# Patient Record
Sex: FEMALE | Race: WHITE
[De-identification: names, ages, dates, MRNs, and addresses within clinical notes are randomized per-mention and may not be internally consistent; named-entity substitution may affect disease eponyms.]

---

## 2017-05-04 ENCOUNTER — HOSPITAL ENCOUNTER (EMERGENCY)
Dept: HOSPITAL 74 - JER | Age: 57
Discharge: HOME | End: 2017-05-04
Payer: COMMERCIAL

## 2017-05-04 VITALS — HEART RATE: 77 BPM | DIASTOLIC BLOOD PRESSURE: 82 MMHG | SYSTOLIC BLOOD PRESSURE: 148 MMHG

## 2017-05-04 VITALS — BODY MASS INDEX: 40.9 KG/M2

## 2017-05-04 VITALS — TEMPERATURE: 98.5 F

## 2017-05-04 DIAGNOSIS — R00.2: ICD-10-CM

## 2017-05-04 DIAGNOSIS — K21.9: ICD-10-CM

## 2017-05-04 DIAGNOSIS — N39.0: Primary | ICD-10-CM

## 2017-05-04 DIAGNOSIS — I10: ICD-10-CM

## 2017-05-04 LAB
ALBUMIN SERPL-MCNC: 3.6 G/DL (ref 3.4–5)
ALP SERPL-CCNC: 105 U/L (ref 45–117)
ALT SERPL-CCNC: 28 U/L (ref 12–78)
ANION GAP SERPL CALC-SCNC: 9 MMOL/L (ref 8–16)
APPEARANCE UR: CLEAR
AST SERPL-CCNC: 17 U/L (ref 15–37)
BASOPHILS # BLD: 0.7 % (ref 0–2)
BILIRUB SERPL-MCNC: 0.2 MG/DL (ref 0.2–1)
BILIRUB UR STRIP.AUTO-MCNC: NEGATIVE MG/DL
CALCIUM SERPL-MCNC: 9.3 MG/DL (ref 8.5–10.1)
CO2 SERPL-SCNC: 28 MMOL/L (ref 21–32)
COCKROFT - GAULT: 111.27
COLOR UR: (no result)
CREAT SERPL-MCNC: 0.9 MG/DL (ref 0.55–1.02)
DEPRECATED RDW RBC AUTO: 14.3 % (ref 11.6–15.6)
EOSINOPHIL # BLD: 1.4 % (ref 0–4.5)
GLUCOSE SERPL-MCNC: 112 MG/DL (ref 74–106)
KETONES UR QL STRIP: NEGATIVE
LEUKOCYTE ESTERASE UR QL STRIP.AUTO: (no result)
MCH RBC QN AUTO: 28.2 PG (ref 25.7–33.7)
MCHC RBC AUTO-ENTMCNC: 32.7 G/DL (ref 32–36)
MCV RBC: 86.3 FL (ref 80–96)
MUCOUS THREADS URNS QL MICRO: (no result)
NEUTROPHILS # BLD: 61.3 % (ref 42.8–82.8)
NITRITE UR QL STRIP: NEGATIVE
PH UR: 6 [PH] (ref 5–8)
PLATELET # BLD AUTO: 219 K/MM3 (ref 134–434)
PMV BLD: 10.4 FL (ref 7.5–11.1)
PROT SERPL-MCNC: 7.2 G/DL (ref 6.4–8.2)
PROT UR QL STRIP: NEGATIVE
PROT UR QL STRIP: NEGATIVE
RBC # BLD AUTO: 3 /HPF (ref 0–3)
RBC # UR STRIP: (no result) /UL
UROBILINOGEN UR STRIP-MCNC: NEGATIVE E.U./DL (ref 0.2–1)
WBC # BLD AUTO: 7 K/MM3 (ref 4–10)
WBC # UR AUTO: 6 /HPF (ref 3–5)

## 2017-05-04 NOTE — PDOC
*Physical Exam





- Vital Signs


 Last Vital Signs











Temp Pulse Resp BP Pulse Ox


 


 98.5 F   79   18   155/83   98 


 


 05/04/17 16:22  05/04/17 16:22  05/04/17 16:22  05/04/17 16:22  05/04/17 16:22














ED Treatment Course





- LABORATORY


CBC & Chemistry Diagram: 


 05/04/17 18:07





 05/04/17 18:07





- ADDITIONAL ORDERS


Additional order review: 


 Laboratory  Results











  05/04/17 05/04/17 05/04/17





  18:07 18:07 16:45


 


Sodium   141 


 


Potassium   3.9 


 


Chloride   104 


 


Carbon Dioxide   28 


 


Anion Gap   9 


 


BUN   16 


 


Creatinine   0.9 


 


Creat Clearance w eGFR   > 60 


 


Random Glucose   112 H 


 


Calcium   9.3 


 


Total Bilirubin   0.2 


 


AST   17 


 


ALT   28 


 


Alkaline Phosphatase   105 


 


Total Protein   7.2 


 


Albumin   3.6 


 


Lipase  110  


 


Urine Color    Ltyellow


 


Urine Appearance    Clear


 


Urine pH    6.0


 


Urine Protein    Negative


 


Urine Glucose (UA)    Negative


 


Urine Ketones    Negative


 


Urine Blood    2+ H


 


Urine Nitrite    Negative


 


Urine Bilirubin    Negative


 


Urine Urobilinogen    Negative


 


Ur Leukocyte Esterase    Trace H


 


Urine RBC    3


 


Urine WBC    6


 


Ur Epithelial Cells    Rare


 


Urine Mucus    Rare








 











  05/04/17





  18:07


 


RBC  4.94


 


MCV  86.3


 


MCHC  32.7


 


RDW  14.3


 


MPV  10.4  D


 


Neutrophils %  61.3  D


 


Lymphocytes %  27.5  D


 


Monocytes %  9.1


 


Eosinophils %  1.4  D


 


Basophils %  0.7














*DC/Admit/Observation/Transfer


Diagnosis at time of Disposition: 


Abdominal pain


Qualifiers:


 Abdominal location: generalized Qualified Code(s): R10.84 - Generalized 

abdominal pain





Urinary tract infection


Qualifiers:


 Urinary tract infection type: site unspecified Hematuria presence: without 

hematuria Qualified Code(s): N39.0 - Urinary tract infection, site not specified





- Discharge Dispostion


Disposition: HOME


Condition at time of disposition: Stable


Admit: No





- Referrals


Referrals: 


Ti Freed MD [Primary Care Provider] - 





- Patient Instructions


Printed Discharge Instructions:  DI for Urinary Tract Infection (UTI), DI for 

Abdominal Pain-Adult


Additional Instructions: 


Please follow u with your primary care doctor if sympotms don't improve





- Post Discharge Activity

## 2017-05-04 NOTE — PDOC
[id number] 57 year old female with significant medical hx of GERD, HTN, and 

intermittent heart palpitations who is presenting to the ED with abdominal pain 

and nausea. Patient reports two days ago having lower abdominal pain with nausea

, vomiting, and diarrhea. Patient endorses four episodes of bloody loose stool 

at that time. Today the patient denies any vomiting but complains of crampy 

lower abdominal pain, that she describes as sharp, with chills and nausea. 

The patient also reports having one BM today. The patient endorses a history of 

episodic colitis.


Denies fever, dysuria, chest pain, recent abx or recent travel.





PMD: Ti Abreu MD








<Era Henning - Last Filed: 05/04/17 18:18>





- General


History Source: Patient, Care Provider





<Nanda Luna - Last Filed: 05/12/17 13:31>





- General


Chief Complaint: Pain


Stated Complaint: STOMACH PAIN





Past History





<Era Henning - Last Filed: 05/04/17 18:18>





- Past Medical History


Cardiac Disorders: Yes (? SVT)


GI Disorders: Yes (GASTRITIS)


HTN: Yes





- Surgical History


Cholecystectomy: Yes





- Psycho/Social/Smoking Cessation Hx


Anxiety: No


Suicidal Ideation: No


Smoking Status: No


Smoking History: Never smoked


Number of Cigarettes Smoked Daily: 0


Information on smoking cessation initiated: No


Hx Alcohol Use: No


Drug/Substance Use Hx: No


Substance Use Type: None


Hx Substance Use Treatment: No





<Nanda Luna - Last Filed: 05/12/17 13:31>





- Past Medical History


Allergies/Adverse Reactions: 


 Allergies











Allergy/AdvReac Type Severity Reaction Status Date / Time


 


No Known Allergies Allergy   Verified 05/04/17 16:24











Home Medications: 


Ambulatory Orders





Amlodipine Besylate [Norvasc] 25 mg PO DAILY 05/29/12 


Pantoprazole Sodium [Protonix] 40 mg PO DAILY 05/04/15 


Sulfamethoxazole/Trimethoprim [Bactrim *Ds*] 1 tab PO BID #14 tablet 05/04/17 











**Review of Systems





- Review of Systems


Comments:: 


05/04/17 18:19


CONSTITUTIONAL:


Present: chills


Absent: fever, diaphoresis, generalized weakness, malaise, loss of appetite


HEENT:


Absent: rhinorrhea, nasal congestion, throat pain, throat swelling, difficulty 

swallowing, mouth swelling, ear pain, eye pain, visual changes


CARDIOVASCULAR: 


Absent: chest pain, syncope, palpitations, irregular heart rate, lightheadedness

, peripheral edema


RESPIRATORY: 


Absent: cough, shortness of breath, dyspnea with exertion, orthopnea, wheezing, 

stridor, hemoptysis


GASTROINTESTINAL:


Present: abdominal pain, nausea, vomiting diarrhea, blood in stool


Absent: abdominal distension, constipation, melena, hematochezia


GENITOURINARY: 


Absent: dysuria, frequency, urgency, hesitancy, hematuria, flank pain, genital 

pain


MUSCULOSKELETAL: 


Absent: myalgia, arthralgia, joint swelling


SKIN: 


Absent: rash, itching, pallor


HEMATOLOGIC/IMMUNOLOGIC: 


Absent: easy bleeding, easy bruising, lymphadenopathy, frequent infections


ENDOCRINE:


Absent: unexplained weight gain, unexplained weight loss, heat intolerance, 

cold intolerance


NEUROLOGIC: 


Absent: headache, focal weakness or paresthesia, dizziness, unsteady gait, 

seizure, mental status changes, bladder or bowel incontinence.


PSYCHIATRIC: 


Absent: anxiety, depression, suicidal or homicidal ideation, hallucinations








<Era Henning - Last Filed: 05/04/17 18:18>





- Review of Systems


Constitutional: Yes: Chills.  No: Diaphoresis, Fever


Respiratory: No: Cough, Orthopnea


Cardiac (ROS): No: Chest Pain, Edema


ABD/GI: Yes: Abd. Pain w/ defecation


: No: Dysuria


All Other Systems: Reviewed and Negative





<Nanda Luna - Last Filed: 05/12/17 13:31>





*Physical Exam





- Vital Signs


 Last Vital Signs











Temp Pulse Resp BP Pulse Ox


 


 98.5 F   79   18   155/83   98 


 


 05/04/17 16:22  05/04/17 16:22  05/04/17 16:22  05/04/17 16:22  05/04/17 16:22














- Physical Exam


Comments: 


05/04/17 18:21


GENERAL:


Well developed, well nourished. Awake and alert. No acute distress.


HEENT:


Normocephalic, atraumatic. PERRLA, EOMI. No conjunctival pallor. Sclera are non-

icteric. Moist mucous membranes. Oropharynx is clear.


NECK: 


Supple. Full ROM. No JVD. Carotid pulses 2+ and symmetric, without bruits. No 

thyromegaly. No 


lymphadenopathy.


CARDIOVASCULAR:


Regular rate and rhythm. No murmurs, rubs, or gallops. Distal pulses are 2+ and 

symmetric. 


PULMONARY: 


No evidence of respiratory distress. Lungs clear to auscultation bilaterally. 

No wheezing, rales or rhonchi.


ABDOMINAL:


Obese, mild suprapubic tenderness to palpation. Non-distended. No rebound or 

guarding. No organomegaly. Normoactive bowel sounds. 


MUSCULOSKELETAL: 


Normal range of motion at all joints. No bony deformities or tenderness. No CVA 

tenderness.


EXTREMITIES: 


No cyanosis. No clubbing. No edema. No calf tenderness.


SKIN: 


Warm and dry. Normal capillary refill. No rashes. No jaundice. 


NEUROLOGICAL: 


Alert, awake, appropriate. Cranial nerves 2-12 intact. Normal speech. Gait is 

normal without ataxia.


PSYCHIATRIC: 


Cooperative. Good eye contact. Appropriate mood and affect.








<Era Henning - Last Filed: 05/04/17 18:18>





- Vital Signs


 Last Vital Signs











Temp Pulse Resp BP Pulse Ox


 


 98.5 F   79   18   155/83   98 


 


 05/04/17 16:22  05/04/17 16:22  05/04/17 16:22  05/04/17 16:22  05/04/17 16:22














<Nanda Luna - Last Filed: 05/12/17 13:31>





ED Treatment Course





- LABORATORY


CBC & Chemistry Diagram: 


 05/04/17 18:07





 05/04/17 18:07





<Era Henning - Last Filed: 05/04/17 18:18>





- LABORATORY


CBC & Chemistry Diagram: 


 05/04/17 18:07





 05/04/17 18:07





- RADIOLOGY


Radiology Studies Ordered: 














 Category Date Time Status


 


 ABDOMEN & PELVIS CT WITH CONTR [CT] Stat CT Scan  05/04/17 18:08 Ordered














<Nanda Luna - Last Filed: 05/12/17 13:31>





Medical Decision Making





- Medical Decision Making





05/04/17 18:14


58 yo  with h/o HTN here with episode of lower abd pain, n/v /d. pt states sxs 

started 2 days ago, with 3 - 4 times nonbloody emesis, loose stool with blood x 

3. sharp lower abd pain. no h/o renal colic. does have h/o episodic colitis. no 

f/c no travel. no abx. pcp Abreu. 


on exam, abd with mild lower abd ttp, no reboun or guarding.


ddgx: diverticulitis, uti pyelo colitis, renal colic. plan labs lipase ua ct a/

p . pt declined pain medication at this time. iv hydration. denies persistant 

nausea.


d/w dr. abreu.





<Nanda Luna - Last Filed: 05/12/17 13:31>





*DC/Admit/Observation/Transfer





- Attestations


Scribe Attestion: 


05/04/17 18:22


Documentation prepared by Era Henning, acting as medical scribe for Nanda Luna MD.





<Era Henning - Last Filed: 05/04/17 18:18>





<Nanda Luna - Last Filed: 05/12/17 13:31>


Diagnosis at time of Disposition: 


Abdominal pain


Qualifiers:


 Abdominal location: generalized Qualified Code(s): R10.84 - Generalized 

abdominal pain





UTI (urinary tract infection)


Qualifiers:


 Urinary tract infection type: site unspecified Hematuria presence: without 

hematuria Qualified Code(s): N39.0 - Urinary tract infection, site not specified





- Discharge Dispostion


Disposition: HOME





- Prescriptions


Prescriptions: 


Sulfamethoxazole/Trimethoprim [Bactrim *Ds*] 1 tab PO BID #14 tablet





- Referrals


Referrals: 


Ti Abreu MD [Primary Care Provider] - 





- Patient Instructions


Printed Discharge Instructions:  DI for Urinary Tract Infection (UTI), DI for 

Abdominal Pain-Adult


Additional Instructions: 


Please follow u with your primary care doctor if sympotms don't improve

## 2017-05-05 LAB — SP GR UR: 1.02 (ref 1–1.02)

## 2019-08-19 ENCOUNTER — HOSPITAL ENCOUNTER (EMERGENCY)
Dept: HOSPITAL 74 - JER | Age: 59
Discharge: HOME | End: 2019-08-19
Payer: SELF-PAY

## 2019-08-19 VITALS — BODY MASS INDEX: 42 KG/M2

## 2019-08-19 VITALS — SYSTOLIC BLOOD PRESSURE: 124 MMHG | DIASTOLIC BLOOD PRESSURE: 68 MMHG | HEART RATE: 68 BPM | TEMPERATURE: 98.5 F

## 2019-08-19 DIAGNOSIS — R10.13: Primary | ICD-10-CM

## 2019-08-19 DIAGNOSIS — K80.80: ICD-10-CM

## 2019-08-19 LAB
ALBUMIN SERPL-MCNC: 3.4 G/DL (ref 3.4–5)
ALP SERPL-CCNC: 95 U/L (ref 45–117)
ALT SERPL-CCNC: 54 U/L (ref 13–61)
ANION GAP SERPL CALC-SCNC: 5 MMOL/L (ref 8–16)
APPEARANCE UR: (no result)
AST SERPL-CCNC: 54 U/L (ref 15–37)
BACTERIA # UR AUTO: 145.8 /HPF
BASOPHILS # BLD: 1 % (ref 0–2)
BILIRUB SERPL-MCNC: 0.6 MG/DL (ref 0.2–1)
BILIRUB UR STRIP.AUTO-MCNC: NEGATIVE MG/DL
BUN SERPL-MCNC: 12.8 MG/DL (ref 7–18)
CALCIUM SERPL-MCNC: 9 MG/DL (ref 8.5–10.1)
CASTS URNS QL MICRO: 14 /LPF (ref 0–8)
CHLORIDE SERPL-SCNC: 108 MMOL/L (ref 98–107)
CO2 SERPL-SCNC: 29 MMOL/L (ref 21–32)
COLOR UR: YELLOW
CREAT SERPL-MCNC: 0.8 MG/DL (ref 0.55–1.3)
DEPRECATED RDW RBC AUTO: 13.8 % (ref 11.6–15.6)
EOSINOPHIL # BLD: 2.2 % (ref 0–4.5)
EPITH CASTS URNS QL MICRO: 11.8 /HPF
GLUCOSE SERPL-MCNC: 98 MG/DL (ref 74–106)
HCT VFR BLD CALC: 42.9 % (ref 32.4–45.2)
HGB BLD-MCNC: 14.4 GM/DL (ref 10.7–15.3)
KETONES UR QL STRIP: NEGATIVE
LEUKOCYTE ESTERASE UR QL STRIP.AUTO: (no result)
LIPASE SERPL-CCNC: 61 U/L (ref 73–393)
LYMPHOCYTES # BLD: 21.7 % (ref 8–40)
MCH RBC QN AUTO: 29.2 PG (ref 25.7–33.7)
MCHC RBC AUTO-ENTMCNC: 33.5 G/DL (ref 32–36)
MCV RBC: 87 FL (ref 80–96)
MONOCYTES # BLD AUTO: 9.4 % (ref 3.8–10.2)
NEUTROPHILS # BLD: 65.7 % (ref 42.8–82.8)
NITRITE UR QL STRIP: NEGATIVE
PH UR: 6.5 [PH] (ref 5–8)
PLATELET # BLD AUTO: 215 K/MM3 (ref 134–434)
PMV BLD: 9 FL (ref 7.5–11.1)
POTASSIUM SERPLBLD-SCNC: 4.1 MMOL/L (ref 3.5–5.1)
PROT SERPL-MCNC: 7.3 G/DL (ref 6.4–8.2)
PROT UR QL STRIP: (no result)
PROT UR QL STRIP: NEGATIVE
RBC # BLD AUTO: 4.93 M/MM3 (ref 3.6–5.2)
RBC # BLD AUTO: 6.6 /HPF (ref 0–4)
SODIUM SERPL-SCNC: 142 MMOL/L (ref 136–145)
SP GR UR: 1.02 (ref 1.01–1.03)
UROBILINOGEN UR STRIP-MCNC: 1 MG/DL (ref 0.2–1)
WBC # BLD AUTO: 5.6 K/MM3 (ref 4–10)
WBC # UR AUTO: 8 /HPF (ref 0–5)

## 2019-08-19 PROCEDURE — 3E033GC INTRODUCTION OF OTHER THERAPEUTIC SUBSTANCE INTO PERIPHERAL VEIN, PERCUTANEOUS APPROACH: ICD-10-PCS

## 2019-08-19 PROCEDURE — 3E0333Z INTRODUCTION OF ANTI-INFLAMMATORY INTO PERIPHERAL VEIN, PERCUTANEOUS APPROACH: ICD-10-PCS

## 2019-08-19 NOTE — PDOC
Rapid Medical Evaluation


Chief Complaint: Nausea/Vomiting


Time Seen by Provider: 08/19/19 13:15


Medical Evaluation: 


 Allergies











Allergy/AdvReac Type Severity Reaction Status Date / Time


 


No Known Allergies Allergy   Verified 07/03/18 17:36











08/19/19 13:15





CC: epigastric pain with vomiting





PE: epigastric and RUQ tenderness- no guarding





Orders: abdominal w/u





Patient will proceed to ED for continued evaluation.





**Discharge Disposition





- Diagnosis


 Abdominal pain








- Referrals


Referrals: 


Ti Freed MD [Primary Care Provider] - 





- Patient Instructions





- Post Discharge Activity

## 2019-08-19 NOTE — PDOC
*Physical Exam





- Vital Signs


 Last Vital Signs











Temp Pulse Resp BP Pulse Ox


 


 98.5 F   68   16   124/68   95 


 


 08/19/19 13:15  08/19/19 13:15  08/19/19 13:15  08/19/19 13:15  08/19/19 13:15














ED Treatment Course





- LABORATORY


CBC & Chemistry Diagram: 


 08/19/19 14:35





 08/19/19 14:35





- ADDITIONAL ORDERS


Additional order review: 


 Laboratory  Results











  08/19/19 08/19/19





  14:35 14:35


 


Sodium  142 


 


Potassium  4.1 


 


Chloride  108 H 


 


Carbon Dioxide  29 


 


Anion Gap  5 L 


 


BUN  12.8 


 


Creatinine  0.8 


 


Est GFR (CKD-EPI)AfAm  92.22 


 


Est GFR (CKD-EPI)NonAf  79.57 


 


Random Glucose  98 


 


Calcium  9.0 


 


Total Bilirubin  0.6 


 


AST  54 H 


 


ALT  54 


 


Alkaline Phosphatase  95 


 


Creatine Kinase  181  171


 


Creatine Kinase Index  No Result Required.  No Result Required.


 


CK-MB (CK-2)  < 1.0  < 1.0


 


Troponin I  < 0.02 


 


Total Protein  7.3 


 


Albumin  3.4 


 


Lipase  61 L 








 











  08/19/19





  14:35


 


RBC  4.93


 


MCV  87.0


 


MCHC  33.5


 


RDW  13.8


 


MPV  9.0


 


Neutrophils %  65.7


 


Lymphocytes %  21.7


 


Monocytes %  9.4


 


Eosinophils %  2.2


 


Basophils %  1.0














- Medications


Given in the ED: 


ED Medications














Discontinued Medications














Generic Name Dose Route Start Last Admin





  Trade Name Freq  PRN Reason Stop Dose Admin


 


Pantoprazole Sodium 40 mg/  100 mls @ 200 mls/hr  08/19/19 13:27  08/19/19 14:44





  Sodium Chloride  IVPB  08/19/19 13:56  200 mls/hr





  ONCE ONE   Administration





     





     





     





     


 


Ketorolac Tromethamine  30 mg  08/19/19 15:56  08/19/19 16:01





  Toradol Injection -  IVPUSH  08/19/19 15:57  30 mg





  ONCE ONE   Administration





     





     





     





     














Medical Decision Making





- Medical Decision Making





08/19/19 16:16


Agree with plan per CHANEL Rouse


Pt with prior episode of abdominal pain, cholelithiasis





08/19/19 16:16


 Laboratory Tests











  08/19/19 08/19/19





  14:35 14:35


 


Creatine Kinase  171 


 


CK-MB (CK-2)  < 1.0 


 


Troponin I   < 0.02











08/19/19 16:17


Multiple non mobile gallstones measuring 1.8 cm, no evidence of acute 

cholecystitis





EKG:  NSR rate of 54 bpm, axis nml, intervals nml, no st elevation or depression

, t waves upright





Pending surgical consult





*DC/Admit/Observation/Transfer


Diagnosis at time of Disposition: 


 Abdominal pain, Gallstones








- Discharge Dispostion


Disposition: HOME


Condition at time of disposition: Improved





- Referrals


Referrals: 


Tyler Bhandari MD [Staff Physician] - 


Ti Freed MD [Primary Care Provider] - 





- Patient Instructions


Printed Discharge Instructions:  DI for Gallstones


Additional Instructions: 


Please follow-up with referred surgeon.


I will call if your urine analysis is positive for infection.


Otherwise read over information about gallstones in diet.








- Post Discharge Activity

## 2019-08-19 NOTE — PDOC
History of Present Illness





- General


Chief Complaint: Nausea/Vomiting


Stated Complaint: VOMITING,ABD PAIN,DIZZINESS


Time Seen by Provider: 08/19/19 13:15


History Source: Patient


Exam Limitations: No Limitations





- History of Present Illness


Travel History: No


Initial Comments: 





08/19/19 14:06


59-year-old female presents to ED with complaints of epigastric pain radiating 

to her right upper quadrant with mild nausea and decreased appetite. Patient 

denies GI disorders but does state history of GERD and takes protonic's on a 

daily basis although she missed today's dose patient states symptoms have been 

intermittent for the past few weeks and was pending blood work by her PCP Dr. abreu but failed to have it collected. 


Timing/Duration: reports: intermittent


Quality: reports: mild, moderate, sharpness


Abdominal Pain Onset Location: reports: RUQ, epigastric


Pain Radiation: reports: no radiation


Activities at Onset: reports: none


Aggravating Factors: improves with: None


Alleviating Factors: improves with: None





Past History





- Travel


Traveled outside of the country in the last 30 days: No


Close contact w/someone who was outside of country & ill: No





- Past Medical History


Allergies/Adverse Reactions: 


 Allergies











Allergy/AdvReac Type Severity Reaction Status Date / Time


 


No Known Allergies Allergy   Verified 08/19/19 13:17











Home Medications: 


Ambulatory Orders





Amlodipine Besylate [Norvasc] 25 mg PO DAILY 05/29/12 


Pantoprazole Sodium [Protonix] 40 mg PO DAILY 05/04/15 


Ibuprofen [Motrin -] 600 mg PO TID #21 tablet 07/03/18 


Methocarbamol [Robaxin -] 500 mg PO TID #21 tablet 07/03/18 








Cardiac Disorders: Yes (? SVT)


COPD: No


GI Disorders: Yes (GASTRITIS)


HTN: Yes





- Surgical History


Cholecystectomy: No





- Suicide/Smoking/Psychosocial Hx


Smoking Status: No


Smoking History: Never smoked


Have you smoked in the past 12 months: No


Number of Cigarettes Smoked Daily: 0


Hx Alcohol Use: No


Drug/Substance Use Hx: No


Substance Use Type: None


Hx Substance Use Treatment: No


Patient Lives Alone: No


Lives with/in: spouse/SO





Abd/GI Specific PMHX





- Complaint Specific PMHX


GERD: Yes





**Review of Systems





- Review of Systems


Able to Perform ROS?: Yes


Constitutional: No: Symptoms Reported


HEENTM: No: Symptoms Reported


Respiratory: No: Symptoms reported


Cardiac (ROS): No: Symptoms Reported


ABD/GI: Yes: Nausea, Poor Appetite, Abdominal cramping


: No: Symptoms Reported


Musculoskeletal: No: Symptoms Reported


Integumentary: No: Symptoms Reported


Neurological: No: Symptoms reported


Endocrine: No: Symptoms Reported





*Physical Exam





- Vital Signs


 Last Vital Signs











Temp Pulse Resp BP Pulse Ox


 


 98.5 F   68   16   124/68   95 


 


 08/19/19 13:15  08/19/19 13:15  08/19/19 13:15  08/19/19 13:15  08/19/19 13:15














- Physical Exam


General Appearance: Yes: Nourished, Appropriately Dressed.  No: Apparent 

Distress


HEENT: positive: EOMI, TAN, TMs Normal, Pharynx Normal.  negative: Pale 

Conjunctivae


Neck: positive: Supple


Respiratory/Chest: positive: Lungs Clear, Normal Breath Sounds.  negative: 

Respiratory Distress, Accessory Muscle Use


Cardiovascular: positive: Regular Rhythm, Regular Rate.  negative: Murmur


Gastrointestinal/Abdominal: positive: Soft, Tenderness (epigastric right upper 

quadrant. negative Hernandez sign)


Musculoskeletal: negative: CVA Tenderness


Extremity: positive: Normal Inspection


Integumentary: positive: Normal Color, Warm, Moist


Neurologic: positive: Motor Strength 5/5 (ambulatory)





ED Treatment Course





- LABORATORY


CBC & Chemistry Diagram: 


 08/19/19 14:35





 08/19/19 14:35





- ADDITIONAL ORDERS


Additional order review: 


 Laboratory  Results











  08/19/19 08/19/19





  14:35 14:35


 


Sodium  142 


 


Potassium  4.1 


 


Chloride  108 H 


 


Carbon Dioxide  29 


 


Anion Gap  5 L 


 


BUN  12.8 


 


Creatinine  0.8 


 


Est GFR (CKD-EPI)AfAm  92.22 


 


Est GFR (CKD-EPI)NonAf  79.57 


 


Random Glucose  98 


 


Calcium  9.0 


 


Total Bilirubin  0.6 


 


AST  54 H 


 


ALT  54 


 


Alkaline Phosphatase  95 


 


Creatine Kinase  181  171


 


Troponin I  < 0.02 


 


Total Protein  7.3 


 


Albumin  3.4 


 


Lipase  61 L 














- RADIOLOGY


Radiology Studies Ordered: 














 Category Date Time Status


 


 ABDOMEN US -LIMITED [US] Stat Ultrasound  08/19/19 13:26 Completed














- Medications


Given in the ED: 


ED Medications














Discontinued Medications














Generic Name Dose Route Start Last Admin





  Trade Name Freq  PRN Reason Stop Dose Admin


 


Pantoprazole Sodium 40 mg/  100 mls @ 200 mls/hr  08/19/19 13:27  08/19/19 14:44





  Sodium Chloride  IVPB  08/19/19 13:56  200 mls/hr





  ONCE ONE   Administration





     





     





     





     














Medical Decision Making





- Medical Decision Making





08/19/19 14:20


Chief complaint: mid abdominal pain causing nausea and decreased appetite. 

Patient with history of GERD and was sent by PCP for evaluation.


Exam: Epigastric and right upper quadrant tenderness on exam negative Hernandez's. 

Vital signs stable


Plan: Labs, urine, EKG and limited abdominal ultrasound. Abdominal CT 2 from 

2018 reviewed


08/19/19 15:51


 Laboratory Tests











  08/19/19 08/19/19 08/19/19





  14:35 14:35 14:35


 


WBC   5.6 


 


Hgb   14.4 


 


Hct   42.9 


 


Absolute Neuts (auto)   3.7 


 


Sodium    142


 


Potassium    4.1


 


Chloride    108 H


 


Carbon Dioxide    29


 


Anion Gap    5 L


 


BUN    12.8


 


Creatinine    0.8


 


Calcium    9.0


 


AST    54 H


 


ALT    54


 


Alkaline Phosphatase    95


 


Creatine Kinase  171  


 


CK-MB (CK-2)  < 1.0  


 


Total Protein    7.3


 


Lipase    61 L











08/19/19 15:52


Shows fatty liver versus hepatocellular disease with focal areas of fatty 

sparing adjacent to the gallbladder. Multiple non-mobile gallstones measuring 

up to 1.8 cm without sonographic evidence of acute cholecystitis noted.


Patient to be given a referral to surgeon. Awaiting urine collection. Patient 

states feeling better.





08/19/19 16:45


Will call pt with urine results. 





*DC/Admit/Observation/Transfer


Diagnosis at time of Disposition: 


 Abdominal pain, Gallstones








- Discharge Dispostion


Disposition: HOME


Condition at time of disposition: Improved





- Referrals


Referrals: 


Tyler Bhandari MD [Staff Physician] - 


Ti Abreu MD [Primary Care Provider] - 





- Patient Instructions


Printed Discharge Instructions:  DI for Gallstones


Additional Instructions: 


Please follow-up with referred surgeon.


I will call if your urine analysis is positive for infection.


Otherwise read over information about gallstones in diet.








- Post Discharge Activity

## 2019-08-20 NOTE — EKG
Test Reason : 

Blood Pressure : ***/*** mmHG

Vent. Rate : 054 BPM     Atrial Rate : 054 BPM

   P-R Int : 128 ms          QRS Dur : 102 ms

    QT Int : 448 ms       P-R-T Axes : 042 -04 013 degrees

   QTc Int : 424 ms

 

SINUS BRADYCARDIA

OTHERWISE NORMAL ECG

WHEN COMPARED WITH ECG OF 30-AUG-2016 18:35,

NO SIGNIFICANT CHANGE WAS FOUND

Confirmed by MD SELIN, CANDE ([id number]) on 8/20/2019 4:15:45 PM

 

Referred By:             Confirmed By:CANDE SMALLWOOD MD

## 2019-10-18 ENCOUNTER — HOSPITAL ENCOUNTER (EMERGENCY)
Dept: HOSPITAL 74 - JERFT | Age: 59
Discharge: HOME | End: 2019-10-18
Payer: COMMERCIAL

## 2019-10-18 VITALS — DIASTOLIC BLOOD PRESSURE: 70 MMHG | TEMPERATURE: 98.2 F | HEART RATE: 65 BPM | SYSTOLIC BLOOD PRESSURE: 153 MMHG

## 2019-10-18 VITALS — BODY MASS INDEX: 41.3 KG/M2

## 2019-10-18 DIAGNOSIS — I47.1: ICD-10-CM

## 2019-10-18 DIAGNOSIS — K52.9: ICD-10-CM

## 2019-10-18 DIAGNOSIS — I10: ICD-10-CM

## 2019-10-18 DIAGNOSIS — M54.9: ICD-10-CM

## 2019-10-18 DIAGNOSIS — E66.01: ICD-10-CM

## 2019-10-18 DIAGNOSIS — M54.6: Primary | ICD-10-CM

## 2019-10-18 NOTE — PDOC
Rapid Medical Evaluation


Chief Complaint: Injury


Time Seen by Provider: 10/18/19 21:29


Medical Evaluation: 


 Allergies











Allergy/AdvReac Type Severity Reaction Status Date / Time


 


No Known Allergies Allergy   Verified 08/19/19 13:17











10/18/19 21:30


I have performed a brief in-person evaluation of this patient.





The patient presents with a chief complaint of: neck, back, arms, legs pain s/p 

fall this afternoon. She was pulling a , and went backwards, landing on 

the floor. No LOC, pt does not think she hit her head anywhere. No AC use. 

Denies dizziness, no CP prior to fall





The patient will proceed to the ED for further evaluation.





**Discharge Disposition





- Diagnosis


 Fall








- Referrals





- Patient Instructions





- Post Discharge Activity

## 2019-10-18 NOTE — PDOC
History of Present Illness





- General


Chief Complaint: Injury


Stated Complaint: FALL


Time Seen by Provider: 10/18/19 21:29


History Source: Patient





- History of Present Illness


Occurred: reports: this evening


Pain Location: reports: back


Method of Injury: Yes: fall





Past History





- Past Medical History


Allergies/Adverse Reactions: 


 Allergies











Allergy/AdvReac Type Severity Reaction Status Date / Time


 


No Known Allergies Allergy   Verified 10/18/19 21:32











Home Medications: 


Ambulatory Orders





Amlodipine Besylate [Norvasc] 25 mg PO DAILY 05/29/12 


Pantoprazole Sodium [Protonix] 40 mg PO DAILY 05/04/15 


Ibuprofen [Motrin -] 600 mg PO TID #21 tablet 07/03/18 


Methocarbamol [Robaxin -] 500 mg PO TID #21 tablet 07/03/18 








Cardiac Disorders: Yes (? SVT)


COPD: No


GI Disorders: Yes (GASTRITIS)


HTN: Yes





- Surgical History


Cholecystectomy: Yes





- Psycho Social/Smoking Cessation Hx


Smoking Status: No


Smoking History: Never smoked


Have you smoked in the past 12 months: No


Number of Cigarettes Smoked Daily: 0


Hx Alcohol Use: No


Drug/Substance Use Hx: No


Substance Use Type: None


Hx Substance Use Treatment: No





Trauma Specific PMHX





- Complaint Specific PMHX


Back Injury: Yes





**Review of Systems





- Review of Systems


ABD/GI: No: Nausea, Vomiting, Abdominal cramping


Musculoskeletal: Yes: Back Pain.  No: Joint Pain, Neck Pain


Neurological: No: Headache, Numbness, Tingling, Weakness, Dizziness





*Physical Exam





- Vital Signs


 Last Vital Signs











Temp Pulse Resp BP Pulse Ox


 


 98.2 F   65   18   153/70   100 


 


 10/18/19 21:29  10/18/19 21:29  10/18/19 21:29  10/18/19 21:29  10/18/19 21:29














- Physical Exam


General Appearance: Yes: Appropriately Dressed.  No: Apparent Distress


HEENT: positive: Normal Voice


Neck: positive: Supple


Respiratory/Chest: negative: Respiratory Distress


Gastrointestinal/Abdominal: positive: Normal Bowel Sounds, Soft.  negative: 

Tender, Distended, Guarding, Rebound


Musculoskeletal: negative: Vertebral Tenderness


Extremity: positive: Normal Inspection


Integumentary: positive: Dry, Warm


Neurologic: positive: Fully Oriented, Alert, Normal Mood/Affect





Medical Decision Making





- Medical Decision Making





10/18/19 22:07


60 yo F, morbidly obesed, HTN, s/p lap may 1 month ago at [facility], here w/ upper 

back pain s/p fall. States she lost her balance and fell onto her back while 

closing her garage around 4 hrs ago.  Shates she did not hit head and denies LOC

, headache, dizziness, nausea or vomiting.  Not on any blood thinners.  Denies 

any other injuries.  States she contacted her surgeon who recommended that she 

come in to ED for evaluation given recent may however patient denies any 

worsening of her baseline abd "soreness"





see exam





M/l upper back sprain s/p fall


No e/o serious injury on exam


No head injury and not on blood thinners per pt


-Dc w/ pain control as needed











Discharge





- Discharge Information


Problems reviewed: Yes


Clinical Impression/Diagnosis: 


Fall


Qualifiers:


 Encounter type: initial encounter Qualified Code(s): W19.XXXA - Unspecified 

fall, initial encounter





Back pain


Qualifiers:


 Back pain location: thoracic back pain Chronicity: acute Back pain laterality: 

bilateral Qualified Code(s): M54.6 - Pain in thoracic spine





Condition: Good


Disposition: HOME





- Follow up/Referral


Referrals: 


Ti Freed MD [Primary Care Provider] - 





- Patient Discharge Instructions


Patient Printed Discharge Instructions:  DI for Back Strain or Sprain


Additional Instructions: 


There is no evidence of serious injury from your fall


Take tylenol for pain as needed





- Post Discharge Activity

## 2020-12-27 ENCOUNTER — HOSPITAL ENCOUNTER (EMERGENCY)
Dept: HOSPITAL 74 - JVIRT | Age: 60
Discharge: HOME | End: 2020-12-27
Payer: COMMERCIAL

## 2020-12-27 DIAGNOSIS — Z11.59: Primary | ICD-10-CM

## 2020-12-27 PROCEDURE — C9803 HOPD COVID-19 SPEC COLLECT: HCPCS

## 2020-12-27 PROCEDURE — U0003 INFECTIOUS AGENT DETECTION BY NUCLEIC ACID (DNA OR RNA); SEVERE ACUTE RESPIRATORY SYNDROME CORONAVIRUS 2 (SARS-COV-2) (CORONAVIRUS DISEASE [COVID-19]), AMPLIFIED PROBE TECHNIQUE, MAKING USE OF HIGH THROUGHPUT TECHNOLOGIES AS DESCRIBED BY CMS-2020-01-R: HCPCS

## 2021-05-24 ENCOUNTER — HOSPITAL ENCOUNTER (EMERGENCY)
Dept: HOSPITAL 74 - JERFT | Age: 61
Discharge: HOME | End: 2021-05-24
Payer: COMMERCIAL

## 2021-05-24 VITALS — SYSTOLIC BLOOD PRESSURE: 137 MMHG | TEMPERATURE: 97.5 F | HEART RATE: 64 BPM | DIASTOLIC BLOOD PRESSURE: 75 MMHG

## 2021-05-24 VITALS — BODY MASS INDEX: 41.1 KG/M2

## 2021-05-24 DIAGNOSIS — M25.562: Primary | ICD-10-CM
